# Patient Record
Sex: MALE | Race: ASIAN | URBAN - METROPOLITAN AREA
[De-identification: names, ages, dates, MRNs, and addresses within clinical notes are randomized per-mention and may not be internally consistent; named-entity substitution may affect disease eponyms.]

---

## 2022-06-09 ENCOUNTER — LAB OUTSIDE AN ENCOUNTER (OUTPATIENT)
Dept: URBAN - METROPOLITAN AREA CLINIC 109 | Facility: CLINIC | Age: 57
End: 2022-06-09

## 2022-06-09 ENCOUNTER — WEB ENCOUNTER (OUTPATIENT)
Dept: URBAN - METROPOLITAN AREA CLINIC 109 | Facility: CLINIC | Age: 57
End: 2022-06-09

## 2022-06-09 ENCOUNTER — OFFICE VISIT (OUTPATIENT)
Dept: URBAN - METROPOLITAN AREA CLINIC 109 | Facility: CLINIC | Age: 57
End: 2022-06-09
Payer: COMMERCIAL

## 2022-06-09 DIAGNOSIS — Z12.11 COLON CANCER SCREENING: ICD-10-CM

## 2022-06-09 DIAGNOSIS — R10.13 EPIGASTRIC PAIN: ICD-10-CM

## 2022-06-09 DIAGNOSIS — Z80.0 FAMILY HISTORY OF LIVER CANCER: ICD-10-CM

## 2022-06-09 PROCEDURE — 99203 OFFICE O/P NEW LOW 30 MIN: CPT | Performed by: INTERNAL MEDICINE

## 2022-06-09 RX ORDER — OMEPRAZOLE 20 MG/1
1 CAPSULE 30 MINUTES BEFORE MORNING MEAL CAPSULE, DELAYED RELEASE ORAL ONCE A DAY
Status: ACTIVE | COMMUNITY

## 2022-06-09 RX ORDER — OMEPRAZOLE 40 MG/1
1 CAPSULE 30 MINUTES BEFORE MORNING MEAL CAPSULE, DELAYED RELEASE PELLETS ORAL ONCE A DAY
Qty: 90 CAPSULE | Refills: 3

## 2022-06-09 RX ORDER — METFORMIN HYDROCHLORIDE 500 MG/1
1 TABLET WITH A MEAL TABLET, FILM COATED ORAL ONCE A DAY
Status: ACTIVE | COMMUNITY

## 2022-06-09 NOTE — HPI-TODAY'S VISIT:
The patient has been referred for epigastric pain and colon screening. A friend provided interpretation today. Epigastric pain has been present for 2 months and is described as dull.  Some nausea is present. Is taking omeprazole 20mg for his pain.  Not helping much. Weight is stable, appetite is good.  H pylori was  tested negative. Patient is diabetic, has HTN, HLD.  No prior colonoscopy. FH of colon cancer in hs father age 78. An uncle had liver cancer.  He is not aware of hepatitis testing.

## 2022-08-03 ENCOUNTER — CLAIMS CREATED FROM THE CLAIM WINDOW (OUTPATIENT)
Dept: URBAN - METROPOLITAN AREA CLINIC 4 | Facility: CLINIC | Age: 57
End: 2022-08-03
Payer: COMMERCIAL

## 2022-08-03 ENCOUNTER — OFFICE VISIT (OUTPATIENT)
Dept: URBAN - METROPOLITAN AREA SURGERY CENTER 23 | Facility: SURGERY CENTER | Age: 57
End: 2022-08-03
Payer: COMMERCIAL

## 2022-08-03 DIAGNOSIS — K29.60 ADENOPAPILLOMATOSIS GASTRICA: ICD-10-CM

## 2022-08-03 DIAGNOSIS — K31.A11 INTESTINAL METAPLASIA OF ANTRUM OF STOMACH WITHOUT DYSPLASIA: ICD-10-CM

## 2022-08-03 DIAGNOSIS — Z12.11 COLON CANCER SCREENING: ICD-10-CM

## 2022-08-03 DIAGNOSIS — K29.40 CHRONIC ATROPHIC GASTRITIS WITHOUT BLEEDING: ICD-10-CM

## 2022-08-03 DIAGNOSIS — K31.89 OTHER DISEASES OF STOMACH AND DUODENUM: ICD-10-CM

## 2022-08-03 DIAGNOSIS — K62.1 ANAL AND RECTAL POLYP: ICD-10-CM

## 2022-08-03 DIAGNOSIS — K63.89 OTHER SPECIFIED DISEASES OF INTESTINE: ICD-10-CM

## 2022-08-03 PROCEDURE — 88342 IMHCHEM/IMCYTCHM 1ST ANTB: CPT | Performed by: PATHOLOGY

## 2022-08-03 PROCEDURE — 88305 TISSUE EXAM BY PATHOLOGIST: CPT | Performed by: PATHOLOGY

## 2022-08-03 PROCEDURE — 43239 EGD BIOPSY SINGLE/MULTIPLE: CPT | Performed by: INTERNAL MEDICINE

## 2022-08-03 PROCEDURE — G8907 PT DOC NO EVENTS ON DISCHARG: HCPCS | Performed by: INTERNAL MEDICINE

## 2022-08-03 PROCEDURE — 45385 COLONOSCOPY W/LESION REMOVAL: CPT | Performed by: INTERNAL MEDICINE

## 2022-08-03 RX ORDER — METFORMIN HYDROCHLORIDE 500 MG/1
1 TABLET WITH A MEAL TABLET, FILM COATED ORAL ONCE A DAY
Status: ACTIVE | COMMUNITY

## 2022-08-03 RX ORDER — OMEPRAZOLE 40 MG/1
1 CAPSULE 30 MINUTES BEFORE MORNING MEAL CAPSULE, DELAYED RELEASE PELLETS ORAL ONCE A DAY
Qty: 90 CAPSULE | Refills: 3 | Status: ACTIVE | COMMUNITY

## 2022-08-23 ENCOUNTER — OFFICE VISIT (OUTPATIENT)
Dept: URBAN - METROPOLITAN AREA CLINIC 109 | Facility: CLINIC | Age: 57
End: 2022-08-23
Payer: COMMERCIAL

## 2022-08-23 VITALS
SYSTOLIC BLOOD PRESSURE: 124 MMHG | HEART RATE: 82 BPM | DIASTOLIC BLOOD PRESSURE: 78 MMHG | WEIGHT: 129 LBS | TEMPERATURE: 97.2 F | HEIGHT: 63 IN | BODY MASS INDEX: 22.86 KG/M2

## 2022-08-23 DIAGNOSIS — K29.70 GASTRITIS WITHOUT BLEEDING, UNSPECIFIED CHRONICITY, UNSPECIFIED GASTRITIS TYPE: ICD-10-CM

## 2022-08-23 PROBLEM — 4556007: Status: ACTIVE | Noted: 2022-08-23

## 2022-08-23 PROCEDURE — 99213 OFFICE O/P EST LOW 20 MIN: CPT | Performed by: INTERNAL MEDICINE

## 2022-08-23 RX ORDER — OMEPRAZOLE 40 MG/1
1 CAPSULE 30 MINUTES BEFORE MORNING MEAL CAPSULE, DELAYED RELEASE PELLETS ORAL ONCE A DAY
Qty: 90 CAPSULE | Refills: 3 | Status: ACTIVE | COMMUNITY

## 2022-08-23 RX ORDER — SUCRALFATE 1 G/1
1 TABLET ON AN EMPTY STOMACH TABLET ORAL TWICE A DAY
Qty: 60 TABLETS | Refills: 3 | OUTPATIENT
Start: 2022-08-23 | End: 2022-12-20

## 2022-08-23 RX ORDER — METFORMIN HYDROCHLORIDE 500 MG/1
1 TABLET WITH A MEAL TABLET, FILM COATED ORAL ONCE A DAY
Status: ACTIVE | COMMUNITY

## 2022-08-23 NOTE — HPI-TODAY'S VISIT:
The patient returns for f/u and discussion of recent EGD and colonoscopy. Interpretation was provided by a friend.  EGD was normal.  H.pylori was not present, but there the appearance of treated h pylori. A rectal polyp was present which was hyperplastic.  He is feeling well except for epigastric pain.  Omeprazole has helped about 40%.  Pain is before meals. Pain has an aching quality.  Eating improves the pain.

## 2022-12-19 ENCOUNTER — OFFICE VISIT (OUTPATIENT)
Dept: URBAN - METROPOLITAN AREA CLINIC 109 | Facility: CLINIC | Age: 57
End: 2022-12-19
Payer: COMMERCIAL

## 2022-12-19 VITALS
WEIGHT: 129 LBS | HEART RATE: 80 BPM | DIASTOLIC BLOOD PRESSURE: 79 MMHG | SYSTOLIC BLOOD PRESSURE: 127 MMHG | HEIGHT: 63 IN | TEMPERATURE: 97.3 F | BODY MASS INDEX: 22.86 KG/M2

## 2022-12-19 DIAGNOSIS — R10.13 DYSPEPSIA: ICD-10-CM

## 2022-12-19 PROCEDURE — 99213 OFFICE O/P EST LOW 20 MIN: CPT | Performed by: INTERNAL MEDICINE

## 2022-12-19 RX ORDER — OMEPRAZOLE 40 MG/1
1 CAPSULE 30 MINUTES BEFORE MORNING MEAL CAPSULE, DELAYED RELEASE PELLETS ORAL ONCE A DAY
Qty: 90 | Refills: 3

## 2022-12-19 RX ORDER — SUCRALFATE 1 G/1
1 TABLET ON AN EMPTY STOMACH TABLET ORAL TWICE A DAY
Qty: 60 TABLETS | Refills: 3 | Status: ACTIVE | COMMUNITY
Start: 2022-08-23 | End: 2022-12-20

## 2022-12-19 RX ORDER — METFORMIN HYDROCHLORIDE 500 MG/1
1 TABLET WITH A MEAL TABLET, FILM COATED ORAL ONCE A DAY
Status: ACTIVE | COMMUNITY

## 2022-12-19 RX ORDER — OMEPRAZOLE 40 MG/1
1 CAPSULE 30 MINUTES BEFORE MORNING MEAL CAPSULE, DELAYED RELEASE PELLETS ORAL ONCE A DAY
Qty: 90 CAPSULE | Refills: 3 | Status: ACTIVE | COMMUNITY

## 2022-12-19 RX ORDER — SUCRALFATE 1 G/1
1 TABLET ON AN EMPTY STOMACH TABLET ORAL TWICE A DAY
Qty: 180 | Refills: 3
Start: 2022-08-23 | End: 2023-12-14

## 2022-12-19 NOTE — HPI-TODAY'S VISIT:
The patient is here for f/u on dyspepsia. He had h pylori negative gastritis on EGD in August 2022. Colonoscopy on the same day revealed a hyperplastic rectal polyp. Currently, doing much better on Carafate BID  and omeprazole daily. Good appetite.  No heartburn.

## 2023-06-19 ENCOUNTER — OFFICE VISIT (OUTPATIENT)
Dept: URBAN - METROPOLITAN AREA CLINIC 109 | Facility: CLINIC | Age: 58
End: 2023-06-19
Payer: COMMERCIAL

## 2023-06-19 VITALS
SYSTOLIC BLOOD PRESSURE: 119 MMHG | BODY MASS INDEX: 22.93 KG/M2 | HEART RATE: 79 BPM | DIASTOLIC BLOOD PRESSURE: 72 MMHG | HEIGHT: 63 IN | WEIGHT: 129.4 LBS

## 2023-06-19 DIAGNOSIS — R10.13 DYSPEPSIA: ICD-10-CM

## 2023-06-19 DIAGNOSIS — Z80.0 FAMILY HISTORY OF LIVER CANCER: ICD-10-CM

## 2023-06-19 PROCEDURE — 99213 OFFICE O/P EST LOW 20 MIN: CPT | Performed by: INTERNAL MEDICINE

## 2023-06-19 RX ORDER — SUCRALFATE 1 G/1
1 TABLET ON AN EMPTY STOMACH TABLET ORAL TWICE A DAY
Qty: 180 | Refills: 3 | COMMUNITY
Start: 2022-08-23 | End: 2023-12-14

## 2023-06-19 RX ORDER — SUCRALFATE 1 G/1
1 TABLET ON AN EMPTY STOMACH TABLET ORAL TWICE A DAY
Qty: 180 | Refills: 3
Start: 2022-08-23 | End: 2024-06-13

## 2023-06-19 RX ORDER — OMEPRAZOLE 40 MG/1
1 CAPSULE 30 MINUTES BEFORE MORNING MEAL CAPSULE, DELAYED RELEASE PELLETS ORAL ONCE A DAY
Qty: 90 | Refills: 3 | COMMUNITY

## 2023-06-19 RX ORDER — METFORMIN HYDROCHLORIDE 500 MG/1
1 TABLET WITH A MEAL TABLET, FILM COATED ORAL ONCE A DAY
COMMUNITY

## 2023-06-19 RX ORDER — OMEPRAZOLE 40 MG/1
1 CAPSULE 30 MINUTES BEFORE MORNING MEAL CAPSULE, DELAYED RELEASE PELLETS ORAL ONCE A DAY
Qty: 90 CAPSULE | Refills: 3

## 2023-06-19 NOTE — HPI-TODAY'S VISIT:
Currently he reports doing well with only mild dyspepsia. No severe pain. Taking Carafate BID and omeprazole 40mg daily An online Telugu translation service was used for this visit.  From 12/19/22 visit: The patient is here for f/u on dyspepsia. He had h pylori negative gastritis on EGD in August 2022. Colonoscopy on the same day revealed a hyperplastic rectal polyp. Currently, doing much better on Carafate BID  and omeprazole daily. Good appetite.  No heartburn.

## 2024-02-21 ENCOUNTER — OV EP (OUTPATIENT)
Dept: URBAN - METROPOLITAN AREA CLINIC 109 | Facility: CLINIC | Age: 59
End: 2024-02-21
Payer: COMMERCIAL

## 2024-02-21 VITALS
WEIGHT: 131 LBS | HEIGHT: 63 IN | BODY MASS INDEX: 23.21 KG/M2 | DIASTOLIC BLOOD PRESSURE: 89 MMHG | TEMPERATURE: 97.3 F | SYSTOLIC BLOOD PRESSURE: 148 MMHG | HEART RATE: 86 BPM

## 2024-02-21 DIAGNOSIS — R10.13 DYSPEPSIA: ICD-10-CM

## 2024-02-21 DIAGNOSIS — K21.9 CHRONIC GERD: ICD-10-CM

## 2024-02-21 PROBLEM — 235595009: Status: ACTIVE | Noted: 2024-02-21

## 2024-02-21 PROCEDURE — 99213 OFFICE O/P EST LOW 20 MIN: CPT | Performed by: INTERNAL MEDICINE

## 2024-02-21 RX ORDER — OMEPRAZOLE 40 MG/1
1 CAPSULE 30 MINUTES BEFORE MORNING MEAL CAPSULE, DELAYED RELEASE PELLETS ORAL ONCE A DAY
Qty: 90 CAPSULE | Refills: 3 | Status: ACTIVE | COMMUNITY

## 2024-02-21 RX ORDER — OMEPRAZOLE 40 MG/1
1 CAPSULE 30 MINUTES BEFORE MORNING MEAL CAPSULE, DELAYED RELEASE ORAL ONCE A DAY
Qty: 90 | Refills: 3 | OUTPATIENT
Start: 2024-02-21

## 2024-02-21 RX ORDER — SUCRALFATE 1 G/1
1 TABLET ON AN EMPTY STOMACH TABLET ORAL TWICE A DAY
Qty: 180 | Refills: 3 | Status: ACTIVE | COMMUNITY
Start: 2022-08-23 | End: 2024-06-13

## 2024-02-21 RX ORDER — METFORMIN HYDROCHLORIDE 500 MG/1
1 TABLET WITH A MEAL TABLET, FILM COATED ORAL ONCE A DAY
Status: ACTIVE | COMMUNITY

## 2024-02-21 NOTE — HPI-TODAY'S VISIT:
22 The patient presents for f/u appt.  previously pt of Dr Chau.  h/o gerd, unremarkable egd in 2022, on ppi/carafate with improved gerd/dyspepsia sx's.  weight stable and denies abd pain, n/v, dysphagia, etc. professional interpretor was used for appt.

## 2025-01-23 ENCOUNTER — DASHBOARD ENCOUNTERS (OUTPATIENT)
Age: 60
End: 2025-01-23

## 2025-02-12 ENCOUNTER — OFFICE VISIT (OUTPATIENT)
Dept: URBAN - METROPOLITAN AREA CLINIC 109 | Facility: CLINIC | Age: 60
End: 2025-02-12

## 2025-03-12 ENCOUNTER — OFFICE VISIT (OUTPATIENT)
Dept: URBAN - METROPOLITAN AREA CLINIC 109 | Facility: CLINIC | Age: 60
End: 2025-03-12
Payer: COMMERCIAL

## 2025-03-12 ENCOUNTER — LAB OUTSIDE AN ENCOUNTER (OUTPATIENT)
Dept: URBAN - METROPOLITAN AREA CLINIC 109 | Facility: CLINIC | Age: 60
End: 2025-03-12

## 2025-03-12 VITALS
DIASTOLIC BLOOD PRESSURE: 80 MMHG | SYSTOLIC BLOOD PRESSURE: 145 MMHG | HEIGHT: 63 IN | TEMPERATURE: 97.5 F | BODY MASS INDEX: 22.54 KG/M2 | WEIGHT: 127.2 LBS | HEART RATE: 97 BPM

## 2025-03-12 DIAGNOSIS — K21.9 CHRONIC GERD: ICD-10-CM

## 2025-03-12 PROBLEM — 162031009: Status: ACTIVE | Noted: 2025-03-12

## 2025-03-12 PROCEDURE — 99214 OFFICE O/P EST MOD 30 MIN: CPT | Performed by: INTERNAL MEDICINE

## 2025-03-12 RX ORDER — LOSARTAN POTASSIUM 25 MG/1
1 TABLET TABLET, FILM COATED ORAL ONCE A DAY
Status: ACTIVE | COMMUNITY

## 2025-03-12 RX ORDER — OMEPRAZOLE 40 MG/1
1 CAPSULE 30 MINUTES BEFORE MORNING MEAL CAPSULE, DELAYED RELEASE ORAL ONCE A DAY
Qty: 90 | Refills: 3 | Status: ACTIVE | COMMUNITY
Start: 2024-02-21

## 2025-03-12 RX ORDER — OMEPRAZOLE 40 MG/1
1 CAPSULE 30 MINUTES BEFORE MORNING MEAL CAPSULE, DELAYED RELEASE ORAL ONCE A DAY
Qty: 90 | Refills: 3 | OUTPATIENT

## 2025-03-12 RX ORDER — METFORMIN HYDROCHLORIDE 500 MG/1
1 TABLET WITH A MEAL TABLET, FILM COATED ORAL ONCE A DAY
Status: ACTIVE | COMMUNITY

## 2025-03-12 RX ORDER — FENOFIBRATE 54 MG/1
1 TABLET WITH FOOD TABLET ORAL ONCE A DAY
Status: ACTIVE | COMMUNITY

## 2025-03-12 NOTE — HPI-TODAY'S VISIT:
The patient presents for follow-up for gerd/dyspepsia.  professional interpretor was used for appt.  chronic gerd, has been on ppi for years, has had new onset epigastric abd pain last couple months.  worse at night and in AM, subsides after breakfast.  denies dysphagia, n/v, or weight loss.  denies lower gi complaints/symptoms.  compliant with PPI.  + tobacco use.

## 2025-03-24 ENCOUNTER — OFFICE VISIT (OUTPATIENT)
Dept: URBAN - METROPOLITAN AREA SURGERY CENTER 23 | Facility: SURGERY CENTER | Age: 60
End: 2025-03-24